# Patient Record
Sex: FEMALE | Race: WHITE | Employment: OTHER | URBAN - METROPOLITAN AREA
[De-identification: names, ages, dates, MRNs, and addresses within clinical notes are randomized per-mention and may not be internally consistent; named-entity substitution may affect disease eponyms.]

---

## 2021-07-22 ENCOUNTER — HOSPITAL ENCOUNTER (EMERGENCY)
Facility: CLINIC | Age: 51
Discharge: HOME | End: 2021-07-22
Attending: EMERGENCY MEDICINE | Admitting: EMERGENCY MEDICINE

## 2021-07-22 ENCOUNTER — APPOINTMENT (OUTPATIENT)
Dept: ULTRASOUND IMAGING | Facility: CLINIC | Age: 51
End: 2021-07-22
Attending: EMERGENCY MEDICINE

## 2021-07-22 ENCOUNTER — APPOINTMENT (OUTPATIENT)
Dept: GENERAL RADIOLOGY | Facility: CLINIC | Age: 51
End: 2021-07-22
Attending: PHYSICIAN ASSISTANT

## 2021-07-22 VITALS
TEMPERATURE: 98.5 F | OXYGEN SATURATION: 99 % | BODY MASS INDEX: 33.23 KG/M2 | SYSTOLIC BLOOD PRESSURE: 121 MMHG | HEART RATE: 61 BPM | RESPIRATION RATE: 16 BRPM | WEIGHT: 180.6 LBS | DIASTOLIC BLOOD PRESSURE: 66 MMHG | HEIGHT: 62 IN

## 2021-07-22 DIAGNOSIS — R07.9 CHEST PAIN, UNSPECIFIED TYPE: ICD-10-CM

## 2021-07-22 DIAGNOSIS — M54.6 ACUTE THORACIC BACK PAIN, UNSPECIFIED BACK PAIN LATERALITY: ICD-10-CM

## 2021-07-22 LAB
ALBUMIN SERPL-MCNC: 4.4 G/DL (ref 3.4–5)
ALP SERPL-CCNC: 69 U/L (ref 40–150)
ALT SERPL W P-5'-P-CCNC: 48 U/L (ref 0–50)
ANION GAP SERPL CALCULATED.3IONS-SCNC: 6 MMOL/L (ref 3–14)
AST SERPL W P-5'-P-CCNC: 42 U/L (ref 0–45)
ATRIAL RATE - MUSE: 74 BPM
BASOPHILS # BLD AUTO: 0.1 10E3/UL (ref 0–0.2)
BASOPHILS NFR BLD AUTO: 1 %
BILIRUB DIRECT SERPL-MCNC: <0.1 MG/DL (ref 0–0.2)
BILIRUB SERPL-MCNC: 0.7 MG/DL (ref 0.2–1.3)
BUN SERPL-MCNC: 15 MG/DL (ref 7–30)
CALCIUM SERPL-MCNC: 10 MG/DL (ref 8.5–10.1)
CHLORIDE BLD-SCNC: 108 MMOL/L (ref 94–109)
CO2 SERPL-SCNC: 26 MMOL/L (ref 20–32)
CREAT SERPL-MCNC: 0.71 MG/DL (ref 0.52–1.04)
CRP SERPL-MCNC: 4.4 MG/L (ref 0–8)
D DIMER PPP FEU-MCNC: 0.27 UG/ML FEU (ref 0–0.5)
DIASTOLIC BLOOD PRESSURE - MUSE: NORMAL MMHG
EOSINOPHIL # BLD AUTO: 0.1 10E3/UL (ref 0–0.7)
EOSINOPHIL NFR BLD AUTO: 1 %
ERYTHROCYTE [DISTWIDTH] IN BLOOD BY AUTOMATED COUNT: 12.4 % (ref 10–15)
ERYTHROCYTE [SEDIMENTATION RATE] IN BLOOD BY WESTERGREN METHOD: 8 MM/HR (ref 0–30)
GFR SERPL CREATININE-BSD FRML MDRD: >90 ML/MIN/1.73M2
GLUCOSE BLD-MCNC: 79 MG/DL (ref 70–99)
HCT VFR BLD AUTO: 41.3 % (ref 35–47)
HGB BLD-MCNC: 13.1 G/DL (ref 11.7–15.7)
HOLD SPECIMEN: NORMAL
IMM GRANULOCYTES # BLD: 0 10E3/UL
IMM GRANULOCYTES NFR BLD: 0 %
INTERPRETATION ECG - MUSE: NORMAL
LIPASE SERPL-CCNC: 112 U/L (ref 73–393)
LYMPHOCYTES # BLD AUTO: 1.8 10E3/UL (ref 0.8–5.3)
LYMPHOCYTES NFR BLD AUTO: 31 %
MCH RBC QN AUTO: 29.2 PG (ref 26.5–33)
MCHC RBC AUTO-ENTMCNC: 31.7 G/DL (ref 31.5–36.5)
MCV RBC AUTO: 92 FL (ref 78–100)
MONOCYTES # BLD AUTO: 0.4 10E3/UL (ref 0–1.3)
MONOCYTES NFR BLD AUTO: 7 %
NEUTROPHILS # BLD AUTO: 3.3 10E3/UL (ref 1.6–8.3)
NEUTROPHILS NFR BLD AUTO: 60 %
NRBC # BLD AUTO: 0 10E3/UL
NRBC BLD AUTO-RTO: 0 /100
P AXIS - MUSE: 45 DEGREES
PLATELET # BLD AUTO: 189 10E3/UL (ref 150–450)
POTASSIUM BLD-SCNC: 4.9 MMOL/L (ref 3.4–5.3)
PR INTERVAL - MUSE: 148 MS
PROT SERPL-MCNC: 8.5 G/DL (ref 6.8–8.8)
QRS DURATION - MUSE: 82 MS
QT - MUSE: 378 MS
QTC - MUSE: 419 MS
R AXIS - MUSE: -38 DEGREES
RBC # BLD AUTO: 4.48 10E6/UL (ref 3.8–5.2)
SODIUM SERPL-SCNC: 140 MMOL/L (ref 133–144)
SYSTOLIC BLOOD PRESSURE - MUSE: NORMAL MMHG
T AXIS - MUSE: 26 DEGREES
TROPONIN I SERPL-MCNC: <0.015 UG/L (ref 0–0.04)
VENTRICULAR RATE- MUSE: 74 BPM
WBC # BLD AUTO: 5.7 10E3/UL (ref 4–11)

## 2021-07-22 PROCEDURE — 83690 ASSAY OF LIPASE: CPT | Performed by: EMERGENCY MEDICINE

## 2021-07-22 PROCEDURE — 36415 COLL VENOUS BLD VENIPUNCTURE: CPT | Performed by: EMERGENCY MEDICINE

## 2021-07-22 PROCEDURE — 93005 ELECTROCARDIOGRAM TRACING: CPT

## 2021-07-22 PROCEDURE — 85652 RBC SED RATE AUTOMATED: CPT | Performed by: EMERGENCY MEDICINE

## 2021-07-22 PROCEDURE — 84484 ASSAY OF TROPONIN QUANT: CPT | Performed by: EMERGENCY MEDICINE

## 2021-07-22 PROCEDURE — 85004 AUTOMATED DIFF WBC COUNT: CPT | Performed by: EMERGENCY MEDICINE

## 2021-07-22 PROCEDURE — 85379 FIBRIN DEGRADATION QUANT: CPT | Performed by: EMERGENCY MEDICINE

## 2021-07-22 PROCEDURE — 71046 X-RAY EXAM CHEST 2 VIEWS: CPT

## 2021-07-22 PROCEDURE — 76705 ECHO EXAM OF ABDOMEN: CPT

## 2021-07-22 PROCEDURE — 86140 C-REACTIVE PROTEIN: CPT | Performed by: EMERGENCY MEDICINE

## 2021-07-22 PROCEDURE — 80048 BASIC METABOLIC PNL TOTAL CA: CPT | Performed by: EMERGENCY MEDICINE

## 2021-07-22 PROCEDURE — 99285 EMERGENCY DEPT VISIT HI MDM: CPT | Mod: 25

## 2021-07-22 PROCEDURE — 250N000013 HC RX MED GY IP 250 OP 250 PS 637: Performed by: PHYSICIAN ASSISTANT

## 2021-07-22 PROCEDURE — 82248 BILIRUBIN DIRECT: CPT | Performed by: EMERGENCY MEDICINE

## 2021-07-22 RX ORDER — ASPIRIN 325 MG
325 TABLET ORAL ONCE
Status: COMPLETED | OUTPATIENT
Start: 2021-07-22 | End: 2021-07-22

## 2021-07-22 RX ADMIN — ASPIRIN 325 MG ORAL TABLET 325 MG: 325 PILL ORAL at 16:21

## 2021-07-22 ASSESSMENT — ENCOUNTER SYMPTOMS
DIARRHEA: 1
FATIGUE: 1
BACK PAIN: 1

## 2021-07-22 ASSESSMENT — MIFFLIN-ST. JEOR: SCORE: 1377.45

## 2021-07-22 NOTE — DISCHARGE INSTRUCTIONS
Discharge Instructions  Chest Pain    You have been seen today for chest pain or discomfort.  At this time, your provider has found no signs that your chest pain is due to a serious or life-threatening condition, (or you have declined more testing and/or admission to the hospital). However, sometimes there is a serious problem that does not show up right away. Your evaluation today may not be complete and you may need further testing and evaluation.     Generally, every Emergency Department visit should have a follow-up clinic visit with either a primary or a specialty clinic/provider. Please follow-up as instructed by your emergency provider today.  Return to the Emergency Department if:  Your chest pain changes, gets worse, starts to happen more often, or comes with less activity.  You are newly short of breath.  You get very weak or tired.  You pass out or faint.  You have any new symptoms, like fever, cough, numb legs, or you cough up blood.  You have anything else that worries you.    Until you follow-up with your regular provider, please do the following:  Take one aspirin daily unless you have an allergy or are told not to by your provider.  If a stress test appointment has been made, go to the appointment.  If you have questions, contact your regular provider.  Follow-up with your regular provider/clinic as directed; this is very important.    If you were given a prescription for medicine here today, be sure to read all of the information (including the package insert) that comes with your prescription.  This will include important information about the medicine, its side effects, and any warnings that you need to know about.  The pharmacist who fills the prescription can provide more information and answer questions you may have about the medicine.  If you have questions or concerns that the pharmacist cannot address, please call or return to the Emergency Department.       Remember that you can always come  back to the Emergency Department if you are not able to see your regular provider in the amount of time listed above, if you get any new symptoms, or if there is anything that worries you.    Discharge Instructions  Back Pain  You were seen today for back pain. Back pain can have many causes, but most will get better without surgery or other specific treatment. Sometimes there is a herniated ( slipped ) disc. We do not usually do MRI scans to look for these right away, since most herniated discs will get better on their own with time.  Today, we did not find any evidence that your back pain was caused by a serious condition. However, sometimes symptoms develop over time and cannot be found during an emergency visit, so it is very important that you follow up with your primary provider.  Generally, every Emergency Department visit should have a follow-up clinic visit with either a primary or a specialty clinic/provider. Please follow-up as instructed by your emergency provider today.    Return to the Emergency Department if:  You develop a fever with your back pain.   You have weakness or change in sensation in one or both legs.  You lose control of your bowels or bladder, or cannot empty your bladder (cannot pee).  Your pain gets much worse.     Follow-up with your provider:  Unless your pain has completely gone away, please make an appointment with your provider within one week. Most of the routine care for back pain is available in a clinic and not the Emergency Department. You may need further management of your back pain, such as more pain medication, imaging such as an X-ray or MRI, or physical therapy.    What can I do to help myself?  Remain Active -- People are often afraid that they will hurt their back further or delay recovery by remaining active, but this is one of the best things you can do for your back. In fact, staying in bed for a long time to rest is not recommended. Studies have shown that people  with low back pain recover faster when they remain active. Movement helps to bring blood flow to the muscles and relieve muscle spasms as well as preventing loss of muscle strength.  Heat -- Using a heating pad can help with low back pain during the first few weeks. Do not sleep with a heating pad, as you can be burned.   Pain medications - You may take a pain medication such as Tylenol  (acetaminophen), Advil , Motrin  (ibuprofen) or Aleve  (naproxen).  If you were given a prescription for medicine here today, be sure to read all of the information (including the package insert) that comes with your prescription.  This will include important information about the medicine, its side effects, and any warnings that you need to know about.  The pharmacist who fills the prescription can provide more information and answer questions you may have about the medicine.  If you have questions or concerns that the pharmacist cannot address, please call or return to the Emergency Department.   Remember that you can always come back to the Emergency Department if you are not able to see your regular provider in the amount of time listed above, if you get any new symptoms, or if there is anything that worries you.

## 2021-07-22 NOTE — ED TRIAGE NOTES
Having chest pain and upper back pain last 2 days since kayaking a few days ago. Having acid reflux and feeling lightheaded, had near syncope at home.

## 2021-07-22 NOTE — ED NOTES
Rapid Assessment Note    History:   Angie Hong is a 51 year old female who presents evaluation of chest and back pain. She started noticing some chest and back pain that started after she was kayaking. She states that she di not have the pain while kayaking. She feels this is likely musculoskeletal, but upon telling her nurse friend about this she suggested she come here for evaluation. No history of diabetes, high blood pressure, high cholesterol, or significant family history. Family history of cardiac disease. She is a former smoker and has quit for 1 year. No history of DVT/PE. No leg swelling or recent surgery. She also states she feels that she has gastric reflux, but denies any vomiting or abdominal pain. She denies shortness of breath, fever, or upper respiratory symptoms.     Exam:   General: Resting comfortably.  Alert and oriented.   Head:  The scalp, face, and head appear normal   Eyes:  Conjunctivae and sclerae are normal    ENT:    The oropharynx is normal    Uvula is in the midline    CV:  Regular rate and rhythm     Normal S1/S2    No peripheral edema  Resp:  Lungs are clear to auscultation    Non-labored    No rales or wheezing    MS:  Normal muscular tone   Skin:  No rash or acute skin lesions noted   Neuro: Speech is normal and fluent.         Plan of Care:   I evaluated the patient and developed an initial plan of care. I discussed this plan and explained that I, or one of my partners, would be returning to complete the evaluation.     I, Alex De La Vega, am serving as a scribe to document services personally performed by Suki Rehman PA-C, based on my observations and the provider's statements to me.    11/5/2018  [employer]    Portions of this medical record were completed by a scribe. UPON MY REVIEW AND AUTHENTICATION BY ELECTRONIC SIGNATURE, this confirms (a) I performed the applicable clinical services, and (b) the record is accurate.        Ori  Suki Santoro PA-C  07/22/21 1573

## 2021-07-22 NOTE — ED PROVIDER NOTES
"  History     Chief Complaint:  Chest Pain and Back Pain      The history is provided by the patient.      Angie Hong is a 51 year old female who presents with chest pain and back pain. Patient states she has been having chest pain and upper back pain for the last 2 days since kayaking a few days ago. Symptoms started the day after kayaking. She notes her pain has been constant since the onset. She states the back pain is located between her shoulder blades and she feels the need to crack her back. Pain exacerbates with deep breathing. Patient reports low blood pressure at baseline. She states having near syncopal episodes yesterday and today. She notes diarrhea and fatigue for the past few days as well.  She also states having acid reflux this morning. Patient had COVID in March 2021 and is COVID vaccinated.       Review of Systems   Constitutional: Positive for fatigue.   Cardiovascular: Positive for chest pain.   Gastrointestinal: Positive for diarrhea.   Musculoskeletal: Positive for back pain.   All other systems reviewed and are negative.      Allergies:  Cefazolin  Lidocaine    Medications:    Progesterone    Past Medical History:    Asthma  Hypothyroidism   IBS  GERD  Pneumonia   Endometriosis     Past Surgical History:    Toe surgery  Cervix lesion destruction     Family History:    Father: hypertension  Mother: cancer, bipolar disorder, hypothyroidism     Social History:  Patient arrives to the ED alone.     Physical Exam     Patient Vitals for the past 24 hrs:   BP Temp Temp src Pulse Resp SpO2 Height Weight   07/22/21 1717 121/66 -- -- 61 -- 99 % -- --   07/22/21 1429 131/77 98.5  F (36.9  C) Temporal 70 16 100 % 1.575 m (5' 2\") 81.9 kg (180 lb 9.6 oz)       Physical Exam    Nursing note and vitals reviewed.  Constitutional:  Oriented to person, place, and time. Cooperative. Appears uncomfortable.  HENT:   Nose:    Nose normal.   Mouth/Throat:   Mucous membranes are normal.   Eyes:    Conjunctivae " normal and EOM are normal.      Pupils are equal, round, and reactive to light.   Neck:    Trachea normal.   Cardiovascular:  Normal rate, regular rhythm, normal heart sounds and normal pulses. No murmur heard.  Pulmonary/Chest:  Effort normal and breath sounds normal.   Abdominal:   Soft. Normal appearance and bowel sounds are normal.      Tenderness to palpation in the upper abdomen.      There is no rebound and no CVA tenderness.   Musculoskeletal:  Extremities atraumatic x 4.   Lymphadenopathy:  No cervical adenopathy.   Neurological:   Alert and oriented to person, place, and time. Normal strength.      No cranial nerve deficit or sensory deficit. GCS eye subscore is 4. GCS verbal subscore is 5. GCS motor subscore is 6.   Skin:    Skin is intact. No rash noted.   Psychiatric:   Normal mood and affect.    Emergency Department Course   ECG:  ECG taken at 1431, ECG read at 1649  Normal sinus rhythm. Left axis deviation. Low voltage QRS. Possible anterolateral infarct, age undetermined. Abnormal ECG.   Rate 74 bpm. NY interval 148 ms. QRS duration 82 ms. QT/QTc 378/419 ms. P-R-T axes 45 -38 26.     Imaging:  US Abdomen Limited (RUQ)  1.  Small probable gallbladder polyp measures 0.3 cm.  2.  Diffuse fatty infiltration of the liver.  Reading per radiology.     Chest XR,  PA & LAT  Negative chest. Lungs clear.  Reading per radiology.     Laboratory:  CBC: WBC: 5.7, HGB: 13.1, PLT: 189    BMP: ALL WNL (Creatinine: 0.71)    Hepatic Panel: ALL Negative    Troponin (Collected 1436): >0.015    D-dimer: 0.27    Lipase: 112    CRP inflammation: 4.4    Erythrocyte sedimentation rate: 8       Emergency Department Course:    Reviewed:  I reviewed nursing notes, vitals, past history and care everywhere    Assessments:  1649 I obtained history and examined the patient as noted above.   1834 I rechecked the patient and explained findings.     Interventions:  1621 Aspirin 325 mg PO    Disposition:  The patient was discharged to  home.    Impression & Plan      Medical Decision Making:  This is a 51-year-old female came in for further evaluation of chest and back pain.  This has been relatively constant over the last couple of days.  She had normal vital signs here but she did appear uncomfortable.  She also had some tenderness to palpation in the upper abdomen.  I felt it was reasonable to proceed with the above work-up including the blood work, EKG, chest x-ray, and an ultrasound of her gallbladder.  With a negative D-dimer, I feel that pulmonary embolism has been sufficiently ruled out.  The rest of her work-up also was unremarkable, all of which is reassuring.  I would expect that if this were cardiac in nature, that her EKG and troponin would be abnormal given the duration of her symptoms.  I suspect her symptoms are likely musculoskeletal in origin given the history of kayaking the day before it started.  She also could have a component of reflux causing it, as she does have an acid taste in her mouth.  She was offered a GI cocktail, however she declined that.  I recommended close outpatient follow-up and certainly returning with any concerns or worsening symptoms.        Diagnosis:    ICD-10-CM    1. Chest pain, unspecified type  R07.9    2. Acute thoracic back pain, unspecified back pain laterality  M54.6        Scribe Disclosure:    I, Marj No, am serving as a scribe at 6:46 PM on 7/22/2021 to document services personally performed by Sharad Loza MD based on my observations and the provider's statements to me.        Sharad Loza MD  07/22/21 2835

## 2021-07-31 ENCOUNTER — HEALTH MAINTENANCE LETTER (OUTPATIENT)
Age: 51
End: 2021-07-31

## 2021-09-25 ENCOUNTER — HEALTH MAINTENANCE LETTER (OUTPATIENT)
Age: 51
End: 2021-09-25

## 2022-08-27 ENCOUNTER — HEALTH MAINTENANCE LETTER (OUTPATIENT)
Age: 52
End: 2022-08-27

## 2022-12-26 ENCOUNTER — HEALTH MAINTENANCE LETTER (OUTPATIENT)
Age: 52
End: 2022-12-26

## 2023-09-17 ENCOUNTER — HEALTH MAINTENANCE LETTER (OUTPATIENT)
Age: 53
End: 2023-09-17